# Patient Record
Sex: MALE | Race: WHITE
[De-identification: names, ages, dates, MRNs, and addresses within clinical notes are randomized per-mention and may not be internally consistent; named-entity substitution may affect disease eponyms.]

---

## 2020-08-16 ENCOUNTER — HOSPITAL ENCOUNTER (EMERGENCY)
Dept: HOSPITAL 63 - ER | Age: 35
Discharge: HOME | End: 2020-08-16
Payer: OTHER GOVERNMENT

## 2020-08-16 VITALS — HEIGHT: 70 IN | WEIGHT: 222.01 LBS | BODY MASS INDEX: 31.78 KG/M2

## 2020-08-16 VITALS — DIASTOLIC BLOOD PRESSURE: 78 MMHG | SYSTOLIC BLOOD PRESSURE: 131 MMHG

## 2020-08-16 DIAGNOSIS — R51: Primary | ICD-10-CM

## 2020-08-16 DIAGNOSIS — R42: ICD-10-CM

## 2020-08-16 LAB
ALBUMIN SERPL-MCNC: 3.7 G/DL (ref 3.4–5)
ALBUMIN/GLOB SERPL: 1.1 {RATIO} (ref 1–1.7)
ALP SERPL-CCNC: 44 U/L (ref 46–116)
ALT SERPL-CCNC: 19 U/L (ref 16–63)
ANION GAP SERPL CALC-SCNC: 11 MMOL/L (ref 6–14)
AST SERPL-CCNC: 15 U/L (ref 15–37)
BASOPHILS # BLD AUTO: 0 X10^3/UL (ref 0–0.2)
BASOPHILS NFR BLD: 1 % (ref 0–3)
BILIRUB SERPL-MCNC: 0.4 MG/DL (ref 0.2–1)
BUN/CREAT SERPL: 19 (ref 6–20)
CA-I SERPL ISE-MCNC: 21 MG/DL (ref 8–26)
CALCIUM SERPL-MCNC: 8.8 MG/DL (ref 8.5–10.1)
CHLORIDE SERPL-SCNC: 104 MMOL/L (ref 98–107)
CO2 SERPL-SCNC: 25 MMOL/L (ref 21–32)
CREAT SERPL-MCNC: 1.1 MG/DL (ref 0.7–1.3)
CRP SERPL-MCNC: < 0.5 MG/L (ref 0–3.3)
EOSINOPHIL NFR BLD: 0.1 X10^3/UL (ref 0–0.7)
EOSINOPHIL NFR BLD: 2 % (ref 0–3)
ERYTHROCYTE [DISTWIDTH] IN BLOOD BY AUTOMATED COUNT: 12.8 % (ref 11.5–14.5)
GFR SERPLBLD BASED ON 1.73 SQ M-ARVRAT: 77.1 ML/MIN
GLOBULIN SER-MCNC: 3.5 G/DL (ref 2.2–3.8)
GLUCOSE SERPL-MCNC: 95 MG/DL (ref 70–99)
HCT VFR BLD CALC: 43.4 % (ref 39–53)
HGB BLD-MCNC: 15 G/DL (ref 13–17.5)
LYMPHOCYTES # BLD: 1.9 X10^3/UL (ref 1–4.8)
LYMPHOCYTES NFR BLD AUTO: 35 % (ref 24–48)
MCH RBC QN AUTO: 33 PG (ref 25–35)
MCHC RBC AUTO-ENTMCNC: 35 G/DL (ref 31–37)
MCV RBC AUTO: 94 FL (ref 79–100)
MONO #: 0.8 X10^3/UL (ref 0–1.1)
MONOCYTES NFR BLD: 15 % (ref 0–9)
NEUT #: 2.6 X10^3UL (ref 1.8–7.7)
NEUTROPHILS NFR BLD AUTO: 48 % (ref 31–73)
PLATELET # BLD AUTO: 208 X10^3/UL (ref 140–400)
POTASSIUM SERPL-SCNC: 3.4 MMOL/L (ref 3.5–5.1)
PROT SERPL-MCNC: 7.2 G/DL (ref 6.4–8.2)
RBC # BLD AUTO: 4.6 X10^6/UL (ref 4.3–5.7)
SODIUM SERPL-SCNC: 140 MMOL/L (ref 136–145)
WBC # BLD AUTO: 5.4 X10^3/UL (ref 4–11)

## 2020-08-16 PROCEDURE — 96374 THER/PROPH/DIAG INJ IV PUSH: CPT

## 2020-08-16 PROCEDURE — 80053 COMPREHEN METABOLIC PANEL: CPT

## 2020-08-16 PROCEDURE — 99284 EMERGENCY DEPT VISIT MOD MDM: CPT

## 2020-08-16 PROCEDURE — 70450 CT HEAD/BRAIN W/O DYE: CPT

## 2020-08-16 PROCEDURE — 86140 C-REACTIVE PROTEIN: CPT

## 2020-08-16 PROCEDURE — 36415 COLL VENOUS BLD VENIPUNCTURE: CPT

## 2020-08-16 PROCEDURE — 85025 COMPLETE CBC W/AUTO DIFF WBC: CPT

## 2020-08-16 NOTE — RAD
CT HEAD WO CONTRAST 

 

Date: 8/16/2020 9:58 PM 

 

Clinical Indication: Reason: sudden onset left side headache / Spl. 

Instructions:  / History: 

 

Comparison: None.

 

Technique:  5 mm axial tomographic images were obtained of the head 

without contrast. These were viewed on brain and bone windows. One or more

of the following dose reduction techniques were utilized: Automated 

exposure control (AEC), Adjustment of mA and/or kV according to patient 

size, Use of iterative reconstruction technique such as ASiR, CT scan done

according to ALARA and image gently/image wisely

 

Findings:

 

The brain parenchyma is normal in attenuation. No intra- or extra-axial 

mass or fluid collection. No acute hemorrhage. The ventricles are normal 

in size, shape, and morphology. The gray-white matter junction is normal. 

The subarachnoid cisterns are patent. 

 

The visualized paranasal sinuses are normal.  The visualized portions of 

the orbits and globes are normal. The mastoid air cells are clear. 

 

The  topogram shows no lytic lesion or fracture. 

 

Impression:

 

No acute intracranial process.

 

Electronically signed by: Lux Guevara MD (8/16/2020 10:22 PM) 

[facility] [place]

## 2020-08-16 NOTE — PHYS DOC
Past History


Past Medical History:  No Pertinent History


Past Surgical History:  Other


Additional Past Surgical Histo:  right knee and leg operation, Left eye


Smoking:  Non-smoker


Alcohol Use:  None





General Adult


EDM:


Chief Complaint:  HEADACHE





HPI:


HPI:





Patient is a 35 year old male who presents for evaluation of sudden onset left-

sided headache.  He states pain was initial moderate in intensity but now down 

to 2 out of 10 at this time.  He described it as a tightness to his head.  

Patient was briefly mildly dizzy.  Patient has a distant history of concussions 

but those were many years ago.  Patient denies any new head injury.  There is no

reported neck pain.  Patient is benign-appearing with no focal deficits or 

lateralizing signs.  There is no reported vision changes or fevers and chills





Review of Systems:


Review of Systems:


Constitutional:  Denies fever or chills 


Eyes:  Denies change in visual acuity 


HENT:  Denies nasal congestion or sore throat 


Respiratory:  Denies cough or shortness of breath 


Cardiovascular:  Denies chest pain or edema 


GI:  Denies abdominal pain, nausea, vomiting, bloody stools or diarrhea 


: Denies dysuria 


Musculoskeletal:  Denies back pain or joint pain 


Integument:  Denies rash 


Neurologic:  acute headache, no focal weakness or sensory changes 


Endocrine:  Denies polyuria or polydipsia 


Lymphatic:  Denies swollen glands 


Psychiatric:  Denies depression or anxiety





Heart Score:


Risk Factors:


Risk Factors:  DM, Current or recent (<one month) smoker, HTN, HLP, family 

history of CAD, obesity.


Risk Scores:


Score 0 - 3:  2.5% MACE over next 6 weeks - Discharge Home


Score 4 - 6:  20.3% MACE over next 6 weeks - Admit for Clinical Observation


Score 7 - 10:  72.7% MACE over next 6 weeks - Early Invasive Strategies





Current Medications:


Current Meds:





Current Medications








 Medications


  (Trade)  Dose


 Ordered  Sig/Juan  Start Time


 Stop Time Status Last Admin


Dose Admin


 


 Sodium Chloride  1,000 ml @ 


 1,000 mls/hr  1X  ONCE  20 22:00


 20 22:59 UNV  














Physical Exam:


PE:





Constitutional: Well developed, well nourished, mild acute distress, non-toxic 

appearance. []


HENT: Normocephalic, atraumatic, bilateral external ears normal, oropharynx 

moist, no oral exudates, nose normal. []


Eyes: PERRL, EOMI, conjunctiva normal, no discharge. [] 


Neck: Normal range of motion, no tenderness, supple, no stridor. [] 


Cardiovascular:Heart rate regular rhythm, no murmur []


Lungs & Thorax:  Bilateral breath sounds clear to auscultation []


Abdomen: Bowel sounds normal, soft, no tenderness, no masses, no pulsatile 

masses. [] 


Skin: Warm, dry, no erythema, no rash. [] 


Back: No tenderness. [] 


Extremities: No tenderness, no cyanosis, ROM intact, no edema. [] 


Neurologic: Alert and oriented X 3, normal motor function, normal sensory 

function, no focal deficits noted, NIH stroke scale 0 []


Psychologic: Affect normal, judgement normal, mood normal. []





Current Patient Data:


Labs:





                                Laboratory Tests








Test


 20


22:10


 


White Blood Count


 5.4 x10^3/uL


(4.0-11.0)


 


Red Blood Count


 4.60 x10^6/uL


(4.30-5.70)


 


Hemoglobin


 15.0 g/dL


(13.0-17.5)


 


Hematocrit


 43.4 %


(39.0-53.0)


 


Mean Corpuscular Volume


 94 fL ()





 


Mean Corpuscular Hemoglobin 33 pg (25-35)  


 


Mean Corpuscular Hemoglobin


Concent 35 g/dL


(31-37)


 


Red Cell Distribution Width


 12.8 %


(11.5-14.5)


 


Platelet Count


 208 x10^3/uL


(140-400)


 


Neutrophils (%) (Auto) 48 % (31-73)  


 


Lymphocytes (%) (Auto) 35 % (24-48)  


 


Monocytes (%) (Auto) 15 % (0-9)  H


 


Eosinophils (%) (Auto) 2 % (0-3)  


 


Basophils (%) (Auto) 1 % (0-3)  


 


Neutrophils # (Auto)


 2.6 x10^3uL


(1.8-7.7)


 


Lymphocytes # (Auto)


 1.9 x10^3/uL


(1.0-4.8)


 


Monocytes # (Auto)


 0.8 x10^3/uL


(0.0-1.1)


 


Eosinophils # (Auto)


 0.1 x10^3/uL


(0.0-0.7)


 


Basophils # (Auto)


 0.0 x10^3/uL


(0.0-0.2)


 


Sodium Level


 140 mmol/L


(136-145)


 


Potassium Level


 3.4 mmol/L


(3.5-5.1)  L


 


Chloride Level


 104 mmol/L


()


 


Carbon Dioxide Level


 25 mmol/L


(21-32)


 


Anion Gap 11 (6-14)  


 


Blood Urea Nitrogen


 21 mg/dL


(8-26)


 


Creatinine


 1.1 mg/dL


(0.7-1.3)


 


Estimated GFR


(Cockcroft-Gault) 77.1  





 


BUN/Creatinine Ratio 19 (6-20)  


 


Glucose Level


 95 mg/dL


(70-99)


 


Calcium Level


 8.8 mg/dL


(8.5-10.1)


 


Total Bilirubin Pending  


 


Aspartate Amino Transferase


(AST) Pending  





 


Alanine Aminotransferase (ALT) Pending  


 


Alkaline Phosphatase Pending  


 


C-Reactive Protein Pending  


 


Total Protein Pending  


 


Albumin Pending  


 


Albumin/Globulin Ratio Pending  








Vital Signs:





                                   Vital Signs








  Date Time  Temp Pulse Resp B/P (MAP) Pulse Ox O2 Delivery O2 Flow Rate FiO2


 


20 21:40 98.6 67 20 131/78 (95) 99 Room Air  











EKG:


EKG:


[]





Radiology/Procedures:


Radiology/Procedures:


[[facility] [address], [place] [address]


                                 [phone]


                                        


                                 IMAGING REPORT





                                     Signed





PATIENT: DANN MARTINEZ CACCOUNT: [id number]     MRN#: [id number]


: 1985           LOCATION: ER              AGE: 35


SEX: M                    EXAM DT: 20         ACCESSION#: [id number]


STATUS: REG ER            ORD. PHYSICIAN: OLEKSANDR SHETH DO


REASON: sudden onset left side headache


PROCEDURE: CT HEAD WO CONTRAST





CT HEAD WO CONTRAST 


 


Date: 2020 9:58 PM 


 


Clinical Indication: Reason: sudden onset left side headache / Spl. 


Instructions:  / History: 


 


Comparison: None.


 


Technique:  5 mm axial tomographic images were obtained of the head 


without contrast. These were viewed on brain and bone windows. One or more


of the following dose reduction techniques were utilized: Automated 


exposure control (AEC), Adjustment of mA and/or kV according to patient 


size, Use of iterative reconstruction technique such as ASiR, CT scan done


according to ALARA and image gently/image wisely


 


Findings:


 


The brain parenchyma is normal in attenuation. No intra- or extra-axial 


mass or fluid collection. No acute hemorrhage. The ventricles are normal 


in size, shape, and morphology. The gray-white matter junction is normal. 


The subarachnoid cisterns are patent. 


 


The visualized paranasal sinuses are normal.  The visualized portions of 


the orbits and globes are normal. The mastoid air cells are clear. 


 


The  topogram shows no lytic lesion or fracture. 


 


Impression:


 


No acute intracranial process.


 


Electronically signed by: Krystal Guevara MD (2020 10:22 PM) 


[facility]














DICTATED AND SIGNED BY:     KRYSTAL GUEVARA MD


DATE:     20





CC: EULALIA BHAGAT MD; OLEKSANDR SHETH DO ~


]





Course & Med Decision Making:


Course & Med Decision Making


Pertinent Labs and Imaging studies reviewed. (See chart for details)





[]





Dragon Disclaimer:


Dragon Disclaimer:


This electronic medical record was generated, in whole or in part, using a voice

 recognition dictation system.





Departure


Departure:


Impression:  


   Primary Impression:  


   Acute headache


   Qualified Codes:  R51 - Headache


Disposition:  01 HOME/RESIDENCE PRIOR TO ADM


Condition:  STABLE


Referrals:  


EULALIA BHAGAT MD (PCP)


Patient Instructions:  General Headache Without Cause





Additional Instructions:  


Call and see your doctor right away for follow-up.  Return if your symptoms 

worsen or persist





Justification of Admission:


Justification of Admission:


Justification of Admission Dx:  N/A











OLEKSANDR SHETH DO              Aug 16, 2020 22:45

## 2021-07-15 ENCOUNTER — HOSPITAL ENCOUNTER (EMERGENCY)
Dept: HOSPITAL 63 - ER | Age: 36
Discharge: HOME | End: 2021-07-15
Payer: OTHER GOVERNMENT

## 2021-07-15 VITALS — DIASTOLIC BLOOD PRESSURE: 90 MMHG | SYSTOLIC BLOOD PRESSURE: 142 MMHG

## 2021-07-15 VITALS — WEIGHT: 222.01 LBS | HEIGHT: 70 IN | BODY MASS INDEX: 31.78 KG/M2
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
## 2021-07-15 DIAGNOSIS — S71.111A: Primary | ICD-10-CM
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
## 2021-07-15 PROCEDURE — 99282 EMERGENCY DEPT VISIT SF MDM: CPT

## 2021-07-15 PROCEDURE — 12001 RPR S/N/AX/GEN/TRNK 2.5CM/<: CPT

## 2021-07-15 NOTE — PHYS DOC
Past History


Past Medical History:  No Pertinent History


Past Surgical History:  Other


Additional Past Surgical Histo:  right knee and leg operation, Left eye


Smoking:  Non-smoker


Alcohol Use:  None





General Adult


EDM:


Chief Complaint:  LACERATION/AVULSION





HPI:


HPI:





Patient is a 35-year-old male presents with laceration to right outer thigh.  

Patient states he ran over a lizard but did not kill it, so tried to put it out 

of its misery and accidentally stabbed himself in the leg.  Bleeding is 

controlled.  Denying pain.  Patient has a history of anxiety and depression.





Review of Systems:


Review of Systems:


Constitutional:  Denies fever or chills 


Eyes:  Denies change in visual acuity 


HENT:  Denies nasal congestion or sore throat 


Respiratory:  Denies cough or shortness of breath 


Cardiovascular:  Denies chest pain or edema 


GI:  Denies abdominal pain, nausea, vomiting, bloody stools or diarrhea 


: Denies dysuria 


Musculoskeletal:  Denies back pain or joint pain 


Integument: 2 cm laceration to the right outer thigh


Neurologic:  Denies headache, focal weakness or sensory changes 


Endocrine:  Denies polyuria or polydipsia 


Lymphatic:  Denies swollen glands 


Psychiatric: Reports depression or anxiety





Current Medications:


Current Meds:





Current Medications








 Medications


  (Trade)  Dose


 Ordered  Sig/Juan  Start Time


 Stop Time Status Last Admin


Dose Admin


 


 Lidocaine/


 Epinephrine


  (Xylocaine


 1%-Epi 1:100,000)  20 ml  STK-MED ONCE  7/15/21 19:05


 7/15/21 19:05 DC  














Allergies:


Allergies:





Allergies








Coded Allergies Type Severity Reaction Last Updated Verified


 


  No Known Allergies Allergy Unknown  7/15/21 Yes











Physical Exam:


PE:





Constitutional: Well developed, well nourished, no acute distress, non-toxic 

appearance. []


HENT: Normocephalic, atraumatic, bilateral external ears normal, oropharynx 

moist, no oral exudates, nose normal. []


Eyes: PERRLA, EOMI, conjunctiva normal, no discharge. [] 


Neck: Normal range of motion, no tenderness, supple, no stridor. [] 


Cardiovascular:Heart rate regular rhythm, no murmur []


Lungs & Thorax:  Bilateral breath sounds clear to auscultation []


Abdomen: Bowel sounds normal, soft, no tenderness, no masses, no pulsatile 

masses. [] 


Skin: 2 cm laceration to right, outer thigh.  Bleeding is controlled.


Back: No tenderness, no CVA tenderness. [] 


Extremities: No tenderness, no cyanosis, no clubbing, ROM intact, no edema. [] 


Neurologic: Alert and oriented X 3, normal motor function, normal sensory 

function, no focal deficits noted. []


Psychologic: Affect normal, judgement normal, mood normal. []





Current Patient Data:


Vital Signs:





                                   Vital Signs








  Date Time  Temp Pulse Resp B/P (MAP) Pulse Ox O2 Delivery O2 Flow Rate FiO2


 


7/15/21 18:30 98.0 66 14 142/90 99   











EKG:


EKG:


[]





Radiology/Procedures:


Radiology/Procedures:


[]





Heart Score:


C/O Chest Pain:  No


Risk Factors:


Risk Factors:  DM, Current or recent (<one month) smoker, HTN, HLP, family 

history of CAD, obesity.


Risk Scores:


Score 0 - 3:  2.5% MACE over next 6 weeks - Discharge Home


Score 4 - 6:  20.3% MACE over next 6 weeks - Admit for Clinical Observation


Score 7 - 10:  72.7% MACE over next 6 weeks - Early Invasive Strategies





Course & Med Decision Making:


Course & Med Decision Making


Pertinent Labs and Imaging studies reviewed. (See chart for details)





[] 35-year-old male presents with laceration to outer, right thigh.  Wound was 

cleaned out.  3, 40, sutures placed.  Wound was covered with gauze.  Patient 

given aftercare instructions.  Discussed signs of infection.  Patient instructed

 to follow-up with PCP or return to emergency room in 7 to 10 days to have 

sutures removed.  Patient was appreciative and okay with discharge plan.





Dragon Disclaimer:


Dragon Disclaimer:


This electronic medical record was generated, in whole or in part, using a voice

 recognition dictation system.





Laceration Repair


Lac Repair


Indication: 2 cm laceration to right outer thigh]





Procedure: The patient was placed in the appropriate position and anesthesia 

around the lack was injected. The area was then cleansed.  The laceration was 

closed, 40, 3 sutures.The wound area was then dressed with gauze.





Total repaired wound length: 2 cm





The patient tolerated the procedure.





Complications: None





Departure


Departure:


Impression:  


   Primary Impression:  


   Laceration


Disposition:  01 HOME / SELF CARE / HOMELESS


Condition:  STABLE


Referrals:  


EULALIA BHAGAT MD (PCP)


Patient Instructions:  Laceration Care, Adult, Easy-to-Read





Additional Instructions:  


You were seen in the emergency room for laceration to your leg.  You had 3 

stitches placed.  You will need the sutures removed in 7 to 10 days.  You can 

either return to the ER or follow-up with your PCP.  Please return to the emerg

ency room if you have worsening symptoms or concerns.





EMERGENCY DEPARTMENT GENERAL DISCHARGE INSTRUCTIONS





Thank you for coming to [facility] (ED) today and trusting us

with you 


care.  We trust that you had a positivie experience in our Emergency Department.

 If you 


wish to speak to the department management, you may call the director at 

[phone].





YOUR FOLLOW UP INSTRUCTIONS ARE AS FOLLOWS:





1.  Do you have a private Doctor?  If you do not have a private doctor, please 

ask for a 


resource list of physicians or clinics that may be able to assist you with 

follow up care.





2.  The Emergency Physician has interpreted your x-rays.  The X-Ray specialist 

will also 


review them.  If there is a change in the findings, you will be notified in 48 

hours when at 


all possible.





3.  A lab test or culture has been done, your results will be reviewed and you 

will be 


notified if you need a change in treatment.





ADDITIONAL INSTRUCTIONS AND INFORMATION:





1.  Your care today has been supervised by a physician who is specially trained 

in emergency 


care.  Many problems require more than one evaluation for a complete diagnosis 

and 


treatment.  We recommend that you schedule your follow up appointment as 

recommended to 


ensure complete treatment of you illness or injury.  If you are unable to obtain

follow up 


care and continue to have a problem, or if your condition worsens, we recommend 

that you 


return to the ED.





2.  We are not able to safely determine your condition over the phone nor are we

able to 


give sound medical advice over the phone.  For these safety reasons, if you call

for medical 


advice we will ask you to come to the ED for further evaluation.





3.  If you have any questions regarding these discharge instructions please call

the ED at 


[phone].





SAFETY INFORMATION:





In the interest of safety, wellness, and injury prevention; we encourage you to 

wear your 


sealbelt, if you smoke; quite smoking, and we encourage family to use a 

protective helmet 


for bicycling and other sporting events that present an increased risk for head 

injury.





IF YOUR SYMPTOMS WORSEN OR NEW SYMPTOMS DEVELOP, OR YOU HAVE CONCERNS ABOUT YOUR

CONDITION; 


OR IF YOUR CONDITION WORSENS WHILE YOU ARE WAITING FOR YOUR FOLLOW UP 

APPOINTMENT; EITHER 


CONTACT YOUR PRIMARY CARE DOCTOR, THE PHYSICIAN WHOSE NAME AND NUMBER YOU WERE 

GIVEN, OR 


RETURN TO THE ED IMMEDIATELY.











JATINDER SPENCER               Jul 15, 2021 19:39